# Patient Record
(demographics unavailable — no encounter records)

---

## 2018-01-13 DIAGNOSIS — E78.5 HYPERLIPIDEMIA LDL GOAL <130: ICD-10-CM

## 2018-01-13 DIAGNOSIS — I10 HYPERTENSION GOAL BP (BLOOD PRESSURE) < 140/90: ICD-10-CM

## 2018-01-16 RX ORDER — PRAVASTATIN SODIUM 40 MG
TABLET ORAL
Qty: 30 TABLET | Refills: 0 | OUTPATIENT
Start: 2018-01-16

## 2018-01-16 RX ORDER — AMLODIPINE AND BENAZEPRIL HYDROCHLORIDE 5; 10 MG/1; MG/1
CAPSULE ORAL
Qty: 180 CAPSULE | Refills: 0 | OUTPATIENT
Start: 2018-01-16

## 2018-01-16 NOTE — TELEPHONE ENCOUNTER
Please inform the patient that refills cold not be approved--has not been seen since 4/2016. Should request refills from his current provider or schedule a follow up appointment.    Jacobo Egan MD

## 2018-01-16 NOTE — TELEPHONE ENCOUNTER
Pravastatin & Lotrel:  Routing refill request to provider for review/approval because:  Labs not current:  FLP, BMP  Patient hasn't been seen since 4/2016    Andra Manuel, RN, BSN

## 2018-01-16 NOTE — TELEPHONE ENCOUNTER
Left message for patient to return call. Please inform them of the information below.    Please inform the patient that refills cold not be approved--has not been seen since 4/2016. Should request refills from his current provider or schedule a follow up appointment.     Jacobo Egan MD

## 2018-01-17 NOTE — TELEPHONE ENCOUNTER
Left message asking patient to return call.      Please inform patient of message from Provider below.   Please assist in scheduling patient.